# Patient Record
Sex: MALE | Race: WHITE | NOT HISPANIC OR LATINO | Employment: STUDENT | URBAN - METROPOLITAN AREA
[De-identification: names, ages, dates, MRNs, and addresses within clinical notes are randomized per-mention and may not be internally consistent; named-entity substitution may affect disease eponyms.]

---
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
## 2022-06-29 NOTE — PROGRESS NOTES
Assessment/Plan:    Other chest pain  Sx of vibration sensation over heart with weight lifting, bearing down during BM  No sob, syncope, lightheadedness  No murmur at rest or with valsalva  EKG shows nsr with rsr  Will get echo, holter, f/u 1 month or sooner if sx change or pending results            Diagnoses and all orders for this visit:    Other chest pain  -     Holter monitor; Future  -     Echo complete w/ contrast if indicated; Future  -     POCT ECG    Other orders  -     sertraline (ZOLOFT) 100 mg tablet; TAKE 1 5 TABLETS (150MG TOTAL) BY MOUTH DAILY        Subjective:      Patient ID: Trisotn Muhammad is a 23 y o  male  Pt is a 22 yo male here today for evaluation of a chest concern  He states he is getting a left sided pain in his chest when he does strength training with his legs, when lifting things, or with bearing down for a bowel movement  He describes this as a vibrating feeling, says it feels like something is going to pop  Sx do not radiate to his arms, back, or abdomen  Sx respond with cessation of triggering activity  This has been going on for a couple of months  He denies trouble breathing, palpitations, lightheadedness  He says that when he lifts weights he sometimes notices blurry vision, he has not had his eyes checked for some time  The following portions of the patient's history were reviewed and updated as appropriate: allergies, current medications, past family history, past medical history, past social history, past surgical history and problem list     Review of Systems   Constitutional: Negative for appetite change and fatigue  Eyes: Positive for visual disturbance (when weight lifting)  Respiratory: Negative for chest tightness and shortness of breath  Cardiovascular: Positive for chest pain ("vibrating feeling")  Negative for palpitations and leg swelling  Musculoskeletal: Negative for arthralgias and myalgias     Neurological: Negative for dizziness, syncope, weakness, light-headedness and headaches  Objective:      /66 (BP Location: Left arm, Patient Position: Sitting, Cuff Size: Adult)   Pulse 56   Temp 97 8 °F (36 6 °C) (Tympanic)   Resp 16   Ht 5' 10 67" (1 795 m)   Wt 77 2 kg (170 lb 3 2 oz)   SpO2 98%   BMI 23 96 kg/m²          Physical Exam  Vitals reviewed  Constitutional:       General: He is awake  He is not in acute distress  Appearance: Normal appearance  He is well-developed and well-groomed  He is not ill-appearing  HENT:      Head: Normocephalic  Eyes:      General: Lids are normal       Conjunctiva/sclera: Conjunctivae normal    Neck:      Thyroid: No thyromegaly  Vascular: Normal carotid pulses  No carotid bruit or JVD  Cardiovascular:      Rate and Rhythm: Normal rate and regular rhythm  Pulses: Normal pulses  Heart sounds: Normal heart sounds  No murmur (no murmur at rest and with valsalva) heard  Comments: EKG with nsr, rsr  Pulmonary:      Effort: Pulmonary effort is normal  No respiratory distress  Breath sounds: Normal breath sounds  Chest:      Chest wall: No deformity or tenderness  Musculoskeletal:      Right lower leg: No edema  Left lower leg: No edema  Comments: No pain with rom   Skin:     General: Skin is warm and dry  Neurological:      Mental Status: He is alert and oriented to person, place, and time  Psychiatric:         Attention and Perception: Attention normal          Mood and Affect: Mood normal          Speech: Speech normal          Behavior: Behavior normal  Behavior is cooperative  Thought Content:  Thought content normal          Cognition and Memory: Cognition normal          Judgment: Judgment normal

## 2022-06-30 ENCOUNTER — OFFICE VISIT (OUTPATIENT)
Dept: FAMILY MEDICINE CLINIC | Facility: CLINIC | Age: 22
End: 2022-06-30
Payer: COMMERCIAL

## 2022-06-30 VITALS
BODY MASS INDEX: 23.83 KG/M2 | HEART RATE: 56 BPM | OXYGEN SATURATION: 98 % | SYSTOLIC BLOOD PRESSURE: 100 MMHG | WEIGHT: 170.2 LBS | RESPIRATION RATE: 16 BRPM | TEMPERATURE: 97.8 F | DIASTOLIC BLOOD PRESSURE: 66 MMHG | HEIGHT: 71 IN

## 2022-06-30 DIAGNOSIS — R07.89 OTHER CHEST PAIN: Primary | ICD-10-CM

## 2022-06-30 PROCEDURE — 93000 ELECTROCARDIOGRAM COMPLETE: CPT | Performed by: NURSE PRACTITIONER

## 2022-06-30 PROCEDURE — 99214 OFFICE O/P EST MOD 30 MIN: CPT | Performed by: NURSE PRACTITIONER

## 2022-06-30 RX ORDER — SERTRALINE HYDROCHLORIDE 100 MG/1
TABLET, FILM COATED ORAL
COMMUNITY
Start: 2022-06-01

## 2022-06-30 NOTE — ASSESSMENT & PLAN NOTE
Sx of vibration sensation over heart with weight lifting, bearing down during BM  No sob, syncope, lightheadedness  No murmur at rest or with valsalva  EKG shows nsr with rsr  Will get echo, holter, f/u 1 month or sooner if sx change or pending results  Emy Morgan

## 2022-07-01 ENCOUNTER — HOSPITAL ENCOUNTER (OUTPATIENT)
Dept: NON INVASIVE DIAGNOSTICS | Facility: HOSPITAL | Age: 22
Discharge: HOME | End: 2022-07-01
Payer: COMMERCIAL

## 2022-07-01 VITALS
HEIGHT: 71 IN | DIASTOLIC BLOOD PRESSURE: 66 MMHG | HEART RATE: 54 BPM | SYSTOLIC BLOOD PRESSURE: 100 MMHG | BODY MASS INDEX: 23.8 KG/M2 | WEIGHT: 170 LBS

## 2022-07-01 DIAGNOSIS — R07.89 OTHER CHEST PAIN: ICD-10-CM

## 2022-07-01 DIAGNOSIS — R07.89 OTHER CHEST PAIN: Primary | ICD-10-CM

## 2022-07-01 LAB
AORTIC ROOT: 3.2 CM
APICAL FOUR CHAMBER EJECTION FRACTION: 69 %
E WAVE DECELERATION TIME: 310 MS
FRACTIONAL SHORTENING: 42 % (ref 28–44)
INTERVENTRICULAR SEPTUM IN DIASTOLE (PARASTERNAL SHORT AXIS VIEW): 0.9 CM
INTERVENTRICULAR SEPTUM: 0.9 CM (ref 0.6–1.1)
LAAS-AP4: 10.1 CM2
LEFT ATRIUM SIZE: 3.5 CM
LEFT INTERNAL DIMENSION IN SYSTOLE: 2.8 CM (ref 2.1–4)
LEFT VENTRICULAR INTERNAL DIMENSION IN DIASTOLE: 4.8 CM (ref 3.5–6)
LEFT VENTRICULAR POSTERIOR WALL IN END DIASTOLE: 0.9 CM
LEFT VENTRICULAR STROKE VOLUME: 80 ML
LVSV (TEICH): 80 ML
MV E'TISSUE VEL-LAT: 11 CM/S
MV PEAK A VEL: 0.49 M/S
MV PEAK E VEL: 116 CM/S
MV STENOSIS PRESSURE HALF TIME: 90 MS
MV VALVE AREA P 1/2 METHOD: 2.44 CM2
RIGHT ATRIUM AREA SYSTOLE A4C: 11.7 CM2
RIGHT VENTRICLE ID DIMENSION: 3.7 CM
SL CV LV EF: 65
SL CV PED ECHO LEFT VENTRICLE DIASTOLIC VOLUME (MOD BIPLANE) 2D: 110 ML
SL CV PED ECHO LEFT VENTRICLE SYSTOLIC VOLUME (MOD BIPLANE) 2D: 30 ML
TR MAX PG: 14 MMHG
TR PEAK VELOCITY: 1.9 M/S
TRICUSPID VALVE PEAK REGURGITATION VELOCITY: 1.89 M/S

## 2022-07-01 PROCEDURE — 93306 TTE W/DOPPLER COMPLETE: CPT | Performed by: INTERNAL MEDICINE

## 2022-07-01 PROCEDURE — 93306 TTE W/DOPPLER COMPLETE: CPT

## 2022-07-01 PROCEDURE — 93226 XTRNL ECG REC<48 HR SCAN A/R: CPT

## 2022-07-01 PROCEDURE — 93225 XTRNL ECG REC<48 HRS REC: CPT

## 2022-07-08 PROCEDURE — 93227 XTRNL ECG REC<48 HR R&I: CPT | Performed by: INTERNAL MEDICINE

## 2022-07-26 ENCOUNTER — OFFICE VISIT (OUTPATIENT)
Dept: FAMILY MEDICINE CLINIC | Facility: CLINIC | Age: 22
End: 2022-07-26
Payer: COMMERCIAL

## 2022-07-26 VITALS
OXYGEN SATURATION: 98 % | RESPIRATION RATE: 14 BRPM | HEART RATE: 73 BPM | SYSTOLIC BLOOD PRESSURE: 90 MMHG | DIASTOLIC BLOOD PRESSURE: 60 MMHG | HEIGHT: 71 IN | TEMPERATURE: 98.3 F | WEIGHT: 162.6 LBS | BODY MASS INDEX: 22.76 KG/M2

## 2022-07-26 DIAGNOSIS — R07.89 OTHER CHEST PAIN: ICD-10-CM

## 2022-07-26 DIAGNOSIS — Z23 ENCOUNTER FOR IMMUNIZATION: ICD-10-CM

## 2022-07-26 DIAGNOSIS — Z00.00 ANNUAL PHYSICAL EXAM: Primary | ICD-10-CM

## 2022-07-26 DIAGNOSIS — F32.1 CURRENT MODERATE EPISODE OF MAJOR DEPRESSIVE DISORDER WITHOUT PRIOR EPISODE (HCC): ICD-10-CM

## 2022-07-26 DIAGNOSIS — F41.1 GENERALIZED ANXIETY DISORDER: ICD-10-CM

## 2022-07-26 DIAGNOSIS — L25.5 RHUS DERMATITIS: ICD-10-CM

## 2022-07-26 PROCEDURE — 99395 PREV VISIT EST AGE 18-39: CPT | Performed by: FAMILY MEDICINE

## 2022-07-26 PROCEDURE — 90471 IMMUNIZATION ADMIN: CPT

## 2022-07-26 PROCEDURE — 90715 TDAP VACCINE 7 YRS/> IM: CPT

## 2022-07-26 RX ORDER — PREDNISONE 10 MG/1
TABLET ORAL
Qty: 30 TABLET | Refills: 0 | Status: SHIPPED | OUTPATIENT
Start: 2022-07-26

## 2022-07-26 NOTE — PROGRESS NOTES
[facility]    NAME: Shivani Subramanian  AGE: 23 y o  SEX: male  : 2000     DATE: 2022     Assessment and Plan:     Problem List Items Addressed This Visit        Musculoskeletal and Integument    Rhus dermatitis    Relevant Medications    predniSONE 10 mg tablet       Other    Generalized anxiety disorder    Relevant Orders    TSH, 3rd generation with Free T4 reflex    Encounter for immunization    Relevant Orders    TDAP VACCINE GREATER THAN OR EQUAL TO 8YO IM (Completed)    Other chest pain    Relevant Orders    CBC    Comprehensive metabolic panel    Current moderate episode of major depressive disorder without prior episode ([address] )     On zoloft and stable         Annual physical exam - Primary          Immunizations and preventive care screenings were discussed with patient today  Appropriate education was printed on patient's after visit summary  Counseling:  Dental Health: discussed importance of regular tooth brushing, flossing, and dental visits  Return in 1 year (on 2023)  Chief Complaint:     Chief Complaint   Patient presents with    Annual Exam     Patient here for annual wellness exam       History of Present Illness:     Adult Annual Physical   Patient here for a comprehensive physical exam  The patient reports problems - still feeling vibration, poison ivy  Diet and Physical Activity  Diet/Nutrition: well balanced diet  Exercise: 3-4 times a week on average  Depression Screening  PHQ-2/9 Depression Screening         General Health  Sleep: sleeps well  Hearing: normal - bilateral   Vision: no vision problems  Dental: regular dental visits   Health  History of STDs?: no      Review of Systems:     Review of Systems   Constitutional: Negative  HENT: Negative  Eyes: Negative  Respiratory: Negative  Cardiovascular: Positive for chest pain (chest vibration)  Gastrointestinal: Negative  Endocrine: Negative  Genitourinary: Negative  Musculoskeletal: Negative  Skin: Positive for rash  Allergic/Immunologic: Negative  Hematological: Negative  Psychiatric/Behavioral: Negative  Past Medical History:     Past Medical History:   Diagnosis Date    Acute non-recurrent maxillary sinusitis 2/14/2019    Allergic     Encounter for well child visit at 14years of age 7/9/2018      Past Surgical History:     History reviewed  No pertinent surgical history  Social History:     Social History     Socioeconomic History    Marital status: Single     Spouse name: None    Number of children: None    Years of education: None    Highest education level: None   Occupational History    None   Tobacco Use    Smoking status: Never Smoker    Smokeless tobacco: Never Used   Vaping Use    Vaping Use: Never used   Substance and Sexual Activity    Alcohol use: No    Drug use: No    Sexual activity: Not Currently   Other Topics Concern    None   Social History Narrative    None     Social Determinants of Health     Financial Resource Strain: Not on file   Food Insecurity: Not on file   Transportation Needs: Not on file   Physical Activity: Not on file   Stress: Not on file   Social Connections: Not on file   Intimate Partner Violence: Not on file   Housing Stability: Not on file      Family History:     Family History   Problem Relation Age of Onset    No Known Problems Father       Current Medications:     Current Outpatient Medications   Medication Sig Dispense Refill    EPINEPHrine (EPIPEN JR) 0 15 mg/0 3 mL SOAJ Inject 0 15 mg into the shoulder, thigh, or buttocks once      predniSONE 10 mg tablet Take 4 daily for 4 days, then 3 daily for 3 days, then 2 daily for 2 days, then 1 daily for 1 days  Take with food   30 tablet 0    sertraline (ZOLOFT) 100 mg tablet TAKE 1 5 TABLETS (150MG TOTAL) BY MOUTH DAILY       No current facility-administered medications for this visit  Allergies: Allergies   Allergen Reactions    Apple - Food Allergy Itching    Other      Annotation - [id number]: pear necturine    Peach Flavor - Food Allergy Itching    Pear - Food Allergy Itching    Pollen Extract     Prunus Persica Itching      Physical Exam:     BP 90/60 (BP Location: Left arm, Patient Position: Sitting, Cuff Size: Standard)   Pulse 73   Temp 98 3 °F (36 8 °C) (Tympanic)   Resp 14   Ht 5' 10 75" (1 797 m)   Wt 73 8 kg (162 lb 9 6 oz)   SpO2 98%   BMI 22 84 kg/m²     Physical Exam  Vitals and nursing note reviewed  Constitutional:       Appearance: Normal appearance  He is well-developed  HENT:      Head: Normocephalic and atraumatic  Right Ear: External ear normal       Left Ear: External ear normal       Nose: Nose normal    Eyes:      Extraocular Movements: Extraocular movements intact  Conjunctiva/sclera: Conjunctivae normal       Pupils: Pupils are equal, round, and reactive to light  Cardiovascular:      Rate and Rhythm: Normal rate and regular rhythm  Pulses: Normal pulses  Heart sounds: Normal heart sounds  Pulmonary:      Effort: Pulmonary effort is normal       Breath sounds: Normal breath sounds  Abdominal:      General: Abdomen is flat  Bowel sounds are normal       Palpations: Abdomen is soft  Musculoskeletal:         General: Normal range of motion  Cervical back: Normal range of motion and neck supple  Skin:     General: Skin is warm and dry  Capillary Refill: Capillary refill takes less than 2 seconds  Neurological:      General: No focal deficit present  Mental Status: He is alert and oriented to person, place, and time  Psychiatric:         Mood and Affect: Mood normal          Behavior: Behavior normal          Thought Content:  Thought content normal          Judgment: Judgment normal           Bishop Rivafaustina, DO   [facility]

## 2022-07-26 NOTE — PATIENT INSTRUCTIONS

## 2022-07-28 ENCOUNTER — APPOINTMENT (OUTPATIENT)
Dept: LAB | Facility: CLINIC | Age: 22
End: 2022-07-28
Payer: COMMERCIAL

## 2022-07-28 DIAGNOSIS — R07.89 OTHER CHEST PAIN: ICD-10-CM

## 2022-07-28 DIAGNOSIS — F41.1 GENERALIZED ANXIETY DISORDER: ICD-10-CM

## 2022-07-28 LAB
ALBUMIN SERPL BCP-MCNC: 3.8 G/DL (ref 3.5–5)
ALP SERPL-CCNC: 75 U/L (ref 34–104)
ALT SERPL W P-5'-P-CCNC: 14 U/L (ref 7–52)
ANION GAP SERPL CALCULATED.3IONS-SCNC: 5 MMOL/L (ref 4–13)
AST SERPL W P-5'-P-CCNC: 12 U/L (ref 13–39)
BILIRUB SERPL-MCNC: 0.43 MG/DL (ref 0.2–1)
BUN SERPL-MCNC: 13 MG/DL (ref 5–25)
CALCIUM SERPL-MCNC: 9 MG/DL (ref 8.4–10.2)
CHLORIDE SERPL-SCNC: 107 MMOL/L (ref 96–108)
CO2 SERPL-SCNC: 29 MMOL/L (ref 21–32)
CREAT SERPL-MCNC: 0.97 MG/DL (ref 0.6–1.3)
ERYTHROCYTE [DISTWIDTH] IN BLOOD BY AUTOMATED COUNT: 12 % (ref 11.6–15.1)
GFR SERPL CREATININE-BSD FRML MDRD: 111 ML/MIN/1.73SQ M
GLUCOSE P FAST SERPL-MCNC: 99 MG/DL (ref 65–99)
HCT VFR BLD AUTO: 46.5 % (ref 36.5–49.3)
HGB BLD-MCNC: 15.7 G/DL (ref 12–17)
MCH RBC QN AUTO: 30.9 PG (ref 26.8–34.3)
MCHC RBC AUTO-ENTMCNC: 33.8 G/DL (ref 31.4–37.4)
MCV RBC AUTO: 92 FL (ref 82–98)
PLATELET # BLD AUTO: 191 THOUSANDS/UL (ref 149–390)
PMV BLD AUTO: 10.8 FL (ref 8.9–12.7)
POTASSIUM SERPL-SCNC: 4.3 MMOL/L (ref 3.5–5.3)
PROT SERPL-MCNC: 6.2 G/DL (ref 6.4–8.4)
RBC # BLD AUTO: 5.08 MILLION/UL (ref 3.88–5.62)
SODIUM SERPL-SCNC: 141 MMOL/L (ref 135–147)
TSH SERPL DL<=0.05 MIU/L-ACNC: 1.8 UIU/ML (ref 0.45–4.5)
WBC # BLD AUTO: 7.44 THOUSAND/UL (ref 4.31–10.16)

## 2022-07-28 PROCEDURE — 80053 COMPREHEN METABOLIC PANEL: CPT

## 2022-07-28 PROCEDURE — 85027 COMPLETE CBC AUTOMATED: CPT

## 2022-07-28 PROCEDURE — 84443 ASSAY THYROID STIM HORMONE: CPT

## 2022-07-28 PROCEDURE — 36415 COLL VENOUS BLD VENIPUNCTURE: CPT
